# Patient Record
Sex: FEMALE
[De-identification: names, ages, dates, MRNs, and addresses within clinical notes are randomized per-mention and may not be internally consistent; named-entity substitution may affect disease eponyms.]

---

## 2019-06-01 ENCOUNTER — HOSPITAL ENCOUNTER (EMERGENCY)
Dept: HOSPITAL 92 - ERS | Age: 29
Discharge: HOME | End: 2019-06-01
Payer: COMMERCIAL

## 2019-06-01 DIAGNOSIS — F17.210: ICD-10-CM

## 2019-06-01 DIAGNOSIS — N39.0: ICD-10-CM

## 2019-06-01 DIAGNOSIS — K63.89: Primary | ICD-10-CM

## 2019-06-01 LAB
ALBUMIN SERPL BCG-MCNC: 4.3 G/DL (ref 3.5–5)
ALP SERPL-CCNC: 56 U/L (ref 40–150)
ALT SERPL W P-5'-P-CCNC: 15 U/L (ref 8–55)
ANION GAP SERPL CALC-SCNC: 9 MMOL/L (ref 10–20)
AST SERPL-CCNC: 16 U/L (ref 5–34)
BACTERIA UR QL AUTO: (no result) HPF
BASOPHILS # BLD AUTO: 0.1 THOU/UL (ref 0–0.2)
BASOPHILS NFR BLD AUTO: 1.4 % (ref 0–1)
BILIRUB SERPL-MCNC: 0.2 MG/DL (ref 0.2–1.2)
BUN SERPL-MCNC: 15 MG/DL (ref 7–18.7)
CALCIUM SERPL-MCNC: 9.7 MG/DL (ref 7.8–10.44)
CHLORIDE SERPL-SCNC: 105 MMOL/L (ref 98–107)
CO2 SERPL-SCNC: 29 MMOL/L (ref 22–29)
CREAT CL PREDICTED SERPL C-G-VRATE: 0 ML/MIN (ref 70–130)
CRYSTAL-AUWI FLAG: 0.1 (ref 0–15)
EOSINOPHIL # BLD AUTO: 0.3 THOU/UL (ref 0–0.7)
EOSINOPHIL NFR BLD AUTO: 3.6 % (ref 0–10)
GLOBULIN SER CALC-MCNC: 2.5 G/DL (ref 2.4–3.5)
GLUCOSE SERPL-MCNC: 101 MG/DL (ref 70–105)
HEV IGM SER QL: 4.7 (ref 0–7.99)
HGB BLD-MCNC: 11.7 G/DL (ref 12–16)
HYALINE CASTS #/AREA URNS LPF: (no result) LPF
LIPASE SERPL-CCNC: 7 U/L (ref 8–78)
LYMPHOCYTES # BLD: 3 THOU/UL (ref 1.2–3.4)
LYMPHOCYTES NFR BLD AUTO: 35.3 % (ref 21–51)
MCH RBC QN AUTO: 25.7 PG (ref 27–31)
MCV RBC AUTO: 80.1 FL (ref 78–98)
MONOCYTES # BLD AUTO: 0.8 THOU/UL (ref 0.11–0.59)
MONOCYTES NFR BLD AUTO: 8.8 % (ref 0–10)
NEUTROPHILS # BLD AUTO: 4.4 THOU/UL (ref 1.4–6.5)
NEUTROPHILS NFR BLD AUTO: 51 % (ref 42–75)
PATHC CAST-AUWI FLAG: 0.95 (ref 0–2.49)
PLATELET # BLD AUTO: 300 THOU/UL (ref 130–400)
POTASSIUM SERPL-SCNC: 3.7 MMOL/L (ref 3.5–5.1)
PREGS CONTROL BACKGROUND?: (no result)
PREGS CONTROL BAR APPEAR?: YES
PREGU CONTROL BACKGROUND?: (no result)
PREGU CONTROL BAR APPEAR?: YES
RBC # BLD AUTO: 4.54 MILL/UL (ref 4.2–5.4)
RBC UR QL AUTO: (no result) HPF (ref 0–3)
SODIUM SERPL-SCNC: 139 MMOL/L (ref 136–145)
SP GR UR STRIP: 1.02 (ref 1–1.04)
SPERM-AUWI FLAG: 0 (ref 0–9.9)
WBC # BLD AUTO: 8.6 THOU/UL (ref 4.8–10.8)
YEAST-AUWI FLAG: 0 (ref 0–25)

## 2019-06-01 PROCEDURE — 81015 MICROSCOPIC EXAM OF URINE: CPT

## 2019-06-01 PROCEDURE — 81003 URINALYSIS AUTO W/O SCOPE: CPT

## 2019-06-01 PROCEDURE — 80053 COMPREHEN METABOLIC PANEL: CPT

## 2019-06-01 PROCEDURE — 76856 US EXAM PELVIC COMPLETE: CPT

## 2019-06-01 PROCEDURE — 36415 COLL VENOUS BLD VENIPUNCTURE: CPT

## 2019-06-01 PROCEDURE — 81025 URINE PREGNANCY TEST: CPT

## 2019-06-01 PROCEDURE — 84703 CHORIONIC GONADOTROPIN ASSAY: CPT

## 2019-06-01 PROCEDURE — 83690 ASSAY OF LIPASE: CPT

## 2019-06-01 PROCEDURE — 74176 CT ABD & PELVIS W/O CONTRAST: CPT

## 2019-06-01 PROCEDURE — 85025 COMPLETE CBC W/AUTO DIFF WBC: CPT

## 2019-06-01 PROCEDURE — 96374 THER/PROPH/DIAG INJ IV PUSH: CPT

## 2019-06-01 PROCEDURE — 96375 TX/PRO/DX INJ NEW DRUG ADDON: CPT

## 2019-06-01 NOTE — ULT
Exam: Pelvic ultrasound



HISTORY: Pelvic pain



COMPARISON: None



TECHNIQUE: Multiple grayscale and color Doppler images were obtained in a transabdominal and transvag
inal pelvic ultrasound. Spectral analysis of the Doppler waveforms of the ovaries were performed.





FINDINGS:

CERVIX: Multiple nabothian cysts

UTERUS: Normal in size without focal abnormality.

ENDOMETRIAL STRIPE: 7 mm.



No free fluid is seen in the pelvis.



RIGHT OVARY: Normal flow without focal mass.

LEFT OVARY: Normal flow without focal mass.



IMPRESSION: Nabothian cysts without evidence of ovarian torsion



Reported By: Chavo Ortez 

Electronically Signed:  6/1/2019 5:07 PM

## 2022-01-13 ENCOUNTER — HOSPITAL ENCOUNTER (OUTPATIENT)
Dept: HOSPITAL 92 - CSHLAB | Age: 32
Discharge: HOME | End: 2022-01-13
Attending: OBSTETRICS & GYNECOLOGY
Payer: COMMERCIAL

## 2022-01-13 DIAGNOSIS — Z20.822: Primary | ICD-10-CM

## 2022-01-13 PROCEDURE — U0005 INFEC AGEN DETEC AMPLI PROBE: HCPCS

## 2022-01-13 PROCEDURE — U0003 INFECTIOUS AGENT DETECTION BY NUCLEIC ACID (DNA OR RNA); SEVERE ACUTE RESPIRATORY SYNDROME CORONAVIRUS 2 (SARS-COV-2) (CORONAVIRUS DISEASE [COVID-19]), AMPLIFIED PROBE TECHNIQUE, MAKING USE OF HIGH THROUGHPUT TECHNOLOGIES AS DESCRIBED BY CMS-2020-01-R: HCPCS

## 2022-01-16 ENCOUNTER — HOSPITAL ENCOUNTER (INPATIENT)
Dept: HOSPITAL 92 - CSHLD | Age: 32
LOS: 3 days | Discharge: HOME | End: 2022-01-19
Attending: OBSTETRICS & GYNECOLOGY | Admitting: OBSTETRICS & GYNECOLOGY
Payer: COMMERCIAL

## 2022-01-16 VITALS — BODY MASS INDEX: 41.3 KG/M2

## 2022-01-16 DIAGNOSIS — Z3A.39: ICD-10-CM

## 2022-01-16 LAB
HGB BLD-MCNC: 11.2 G/DL (ref 12–15.5)
HIV 1/2 INDEX: 0.07 S/CO (ref ?–1)
MCH RBC QN AUTO: 26.7 PG (ref 27–33)
MCV RBC AUTO: 83.8 FL (ref 81.6–98.3)
PLATELET # BLD AUTO: 193 10X3/UL (ref 150–450)
RBC # BLD AUTO: 4.19 10X6/UL (ref 3.9–5.03)
SYPHILIS ANTIBODY INDEX: 0.04 S/CO
WBC # BLD AUTO: 8.2 10X3/UL (ref 3.5–10.5)

## 2022-01-16 PROCEDURE — 36415 COLL VENOUS BLD VENIPUNCTURE: CPT

## 2022-01-16 PROCEDURE — 87340 HEPATITIS B SURFACE AG IA: CPT

## 2022-01-16 PROCEDURE — 51702 INSERT TEMP BLADDER CATH: CPT

## 2022-01-16 PROCEDURE — S0020 INJECTION, BUPIVICAINE HYDRO: HCPCS

## 2022-01-16 PROCEDURE — 86850 RBC ANTIBODY SCREEN: CPT

## 2022-01-16 PROCEDURE — 86900 BLOOD TYPING SEROLOGIC ABO: CPT

## 2022-01-16 PROCEDURE — 85027 COMPLETE CBC AUTOMATED: CPT

## 2022-01-16 PROCEDURE — U0005 INFEC AGEN DETEC AMPLI PROBE: HCPCS

## 2022-01-16 PROCEDURE — 87389 HIV-1 AG W/HIV-1&-2 AB AG IA: CPT

## 2022-01-16 PROCEDURE — 86901 BLOOD TYPING SEROLOGIC RH(D): CPT

## 2022-01-16 PROCEDURE — U0003 INFECTIOUS AGENT DETECTION BY NUCLEIC ACID (DNA OR RNA); SEVERE ACUTE RESPIRATORY SYNDROME CORONAVIRUS 2 (SARS-COV-2) (CORONAVIRUS DISEASE [COVID-19]), AMPLIFIED PROBE TECHNIQUE, MAKING USE OF HIGH THROUGHPUT TECHNOLOGIES AS DESCRIBED BY CMS-2020-01-R: HCPCS

## 2022-01-16 PROCEDURE — 86780 TREPONEMA PALLIDUM: CPT

## 2022-01-17 LAB — HBSAG INDEX: 0.18 S/CO (ref 0–0.99)

## 2022-01-18 VITALS — TEMPERATURE: 97.8 F

## 2022-01-18 LAB
HGB BLD-MCNC: 11.1 G/DL (ref 12–15.5)
MCH RBC QN AUTO: 26.8 PG (ref 27–33)
MCV RBC AUTO: 85.5 FL (ref 81.6–98.3)
PLATELET # BLD AUTO: 185 10X3/UL (ref 150–450)
RBC # BLD AUTO: 4.14 10X6/UL (ref 3.9–5.03)
WBC # BLD AUTO: 19.3 10X3/UL (ref 3.5–10.5)

## 2022-01-19 VITALS — DIASTOLIC BLOOD PRESSURE: 58 MMHG | SYSTOLIC BLOOD PRESSURE: 106 MMHG
